# Patient Record
(demographics unavailable — no encounter records)

---

## 2024-10-10 NOTE — ASSESSMENT
[FreeTextEntry1] : Data reviewed:  Ulices 10/10/2024: restricted, FEV1 75% PFT 10/10/2024 : entirely normal, FEV1 86% / FENO 28  Impression: Chronic episodic cough Allergies, eczema History of childhood asthma Never smoker  Plan: May have intermittent asthma, or cough may be allergic in origin. Offered re-eval when symptomatic, vs methacholine challenge. He opts for re-eval when symptomatic, which is fine.

## 2024-10-10 NOTE — CONSULT LETTER
[Dear  ___] : Dear  [unfilled], [Courtesy Letter:] : I had the pleasure of seeing your patient, [unfilled], in my office today. [Please see my note below.] : Please see my note below. [Consult Closing:] : Thank you very much for allowing me to participate in the care of this patient.  If you have any questions, please do not hesitate to contact me. [Sincerely,] : Sincerely, [FreeTextEntry2] : Jacquelin Oropeza MD, FAAD [address] [place], [place] [FreeTextEntry3] : [facility] John [facility], [facility]

## 2024-10-10 NOTE — HISTORY OF PRESENT ILLNESS
[Never] : never [TextBox_4] : 10/10/2024: Self referred for cough. History of childhood asthma. Son age 4 has asthma. Has history of allergies as well, sees a Dr Oropeza on [facility] who is an allergist (actually, when I looked her up to send a letter, she is a dermatologist). He himself complains of a cough for the past couple of years, which comes and goes. He takes antihistamines prn throat itching, which occurs every 3-4 days. Has eczema. Not coughing at all right now but will have periods of coughing lasting a few weeks. No wheeze, not dyspneic. No clear provoking factor. Works in [employer]. Lives [place], no pets or animals. Never smoker. He does have albuterol and uses it when he coughs. On no inhalers now. No PND sensation, no GERD sensation.